# Patient Record
Sex: MALE | NOT HISPANIC OR LATINO | URBAN - METROPOLITAN AREA
[De-identification: names, ages, dates, MRNs, and addresses within clinical notes are randomized per-mention and may not be internally consistent; named-entity substitution may affect disease eponyms.]

---

## 2017-03-09 PROBLEM — L98.429: Status: ACTIVE | Noted: 2017-03-09

## 2017-05-30 ENCOUNTER — IMPORTED ENCOUNTER (OUTPATIENT)
Dept: URBAN - METROPOLITAN AREA CLINIC 1 | Facility: CLINIC | Age: 77
End: 2017-05-30

## 2017-05-30 PROBLEM — H26.493: Noted: 2017-05-30

## 2017-05-30 PROBLEM — H04.123: Noted: 2017-05-30

## 2017-05-30 PROBLEM — H16.143: Noted: 2017-05-30

## 2017-05-30 PROBLEM — H43.813: Noted: 2017-05-30

## 2017-05-30 PROBLEM — H35.013: Noted: 2017-05-30

## 2017-05-30 PROBLEM — Z96.1: Noted: 2017-05-30

## 2017-05-30 PROCEDURE — 92014 COMPRE OPH EXAM EST PT 1/>: CPT

## 2017-05-30 PROCEDURE — 92015 DETERMINE REFRACTIVE STATE: CPT

## 2017-05-30 NOTE — PATIENT DISCUSSION
1.  Pseudophakia (TORIC) OU w/ PCO OU: (Posterior Capsule Opacification)   Observe and consider yag cap when pt feels pco visually significant and visual acuity decreases to appropriate level. 2. GR I Athero Vascular/Sclerotic Dz OU - stable. 3. ABBY w/ PEK OU - The use/continuation of artificial tears were recommended. 4.  PVD w/o Tear OU - old/stable. Return for an appointment in November for [facility] / geovanny with Dr. Bronson Blandon.

## 2017-11-27 ENCOUNTER — IMPORTED ENCOUNTER (OUTPATIENT)
Dept: URBAN - METROPOLITAN AREA CLINIC 1 | Facility: CLINIC | Age: 77
End: 2017-11-27

## 2017-11-27 PROBLEM — Z96.1: Noted: 2017-11-27

## 2017-11-27 PROBLEM — H26.493: Noted: 2017-11-27

## 2017-11-27 PROBLEM — H43.813: Noted: 2017-11-27

## 2017-11-27 PROBLEM — H04.123: Noted: 2017-11-27

## 2017-11-27 PROBLEM — H16.143: Noted: 2017-11-27

## 2017-11-27 PROBLEM — H35.013: Noted: 2017-11-27

## 2017-11-27 PROCEDURE — 92012 INTRM OPH EXAM EST PATIENT: CPT

## 2017-11-27 NOTE — PATIENT DISCUSSION
1.  PCO OU- Visually Significant secondary to glare; Pt wishes to proceed with YAG Cap OD then OS. Pros cons risks and benefits discussed. 2.  Pseudophakia OU - (Toric OU) 3. ABBY w/ PEK OU- Cont ATs TID OU Routinely. 4.  PVD OU - RD precautions. 5.  GR I Athero Vascular Dz OU- stable observe. Return for an appointment in YAG Cap OD then OS. with Dr. Jaskaran Grigsby.

## 2017-12-15 ENCOUNTER — IMPORTED ENCOUNTER (OUTPATIENT)
Dept: URBAN - METROPOLITAN AREA CLINIC 1 | Facility: CLINIC | Age: 77
End: 2017-12-15

## 2017-12-15 PROCEDURE — 66821 AFTER CATARACT LASER SURGERY: CPT

## 2017-12-15 NOTE — PATIENT DISCUSSION
YAG CAP OD: (Consent signed and scanned into attachments) 1 gtt Prolensa applied. The purpose and nature of the procedure possible alternative methods of treatment the risks involved and the possibility of complications were discussed with patient. The Patient wishes to proceed and the consent was signed. The laser was then performed under topical anesthesia with no complications. Post op instructions were given to patient as well as a follow-up appointment. Patient was advised to call our office if any questions or concerns.  RTC as scheduled

## 2017-12-18 PROBLEM — H26.491: Noted: 2017-12-18

## 2018-01-19 ENCOUNTER — IMPORTED ENCOUNTER (OUTPATIENT)
Dept: URBAN - METROPOLITAN AREA CLINIC 1 | Facility: CLINIC | Age: 78
End: 2018-01-19

## 2018-01-19 PROBLEM — H26.492: Noted: 2018-01-19

## 2018-01-19 PROCEDURE — 66821 AFTER CATARACT LASER SURGERY: CPT

## 2018-01-19 NOTE — PATIENT DISCUSSION
YAG CAP OS: (Consent signed and scanned into attachments) 1 gtt Prolensa applied. The purpose and nature of the procedure possible alternative methods of treatment the risks involved and the possibility of complications were discussed with patient. The Patient wishes to proceed and the consent was signed. The laser was then performed under topical anesthesia with no complications. Post op instructions were given to patient as well as a follow-up appointment. Patient was advised to call our office if any questions or concerns.  RTC 1-6 week PO

## 2018-02-02 ENCOUNTER — IMPORTED ENCOUNTER (OUTPATIENT)
Dept: URBAN - METROPOLITAN AREA CLINIC 1 | Facility: CLINIC | Age: 78
End: 2018-02-02

## 2018-02-02 PROCEDURE — 99024 POSTOP FOLLOW-UP VISIT: CPT

## 2018-02-02 NOTE — PATIENT DISCUSSION
PO YAG Cap OU: good result. MRX given. Return for an appointment for a 30 in 6 months with Dr. Katlyn Montague.

## 2018-08-09 ENCOUNTER — IMPORTED ENCOUNTER (OUTPATIENT)
Dept: URBAN - METROPOLITAN AREA CLINIC 1 | Facility: CLINIC | Age: 78
End: 2018-08-09

## 2018-08-09 PROBLEM — H43.813: Noted: 2018-08-09

## 2018-08-09 PROBLEM — H16.143: Noted: 2018-08-09

## 2018-08-09 PROBLEM — Z96.1: Noted: 2018-08-09

## 2018-08-09 PROBLEM — H04.123: Noted: 2018-08-09

## 2018-08-09 PROBLEM — H35.013: Noted: 2018-08-09

## 2018-08-09 PROCEDURE — 92014 COMPRE OPH EXAM EST PT 1/>: CPT

## 2018-08-09 NOTE — PATIENT DISCUSSION
1.  ABBY w/ PEK OU-The increase of artificial tears OU to QID were recommended. Urged compliance w/ routine tear use. 2. GR I Athero Vascular Dz OU- Stable Observe. 3. PVD OU- Old stable. 4.  Pseudophakia OU (Torics OU)- H/o Yag OU5. Return for an appointment for 27 in 1 year with Dr. Estevan Johnson.

## 2019-08-13 ENCOUNTER — IMPORTED ENCOUNTER (OUTPATIENT)
Dept: URBAN - METROPOLITAN AREA CLINIC 1 | Facility: CLINIC | Age: 79
End: 2019-08-13

## 2019-08-13 PROBLEM — H16.143: Noted: 2019-08-13

## 2019-08-13 PROBLEM — H43.813: Noted: 2019-08-13

## 2019-08-13 PROBLEM — H04.123: Noted: 2019-08-13

## 2019-08-13 PROBLEM — Z96.1: Noted: 2019-08-13

## 2019-08-13 PROBLEM — H35.013: Noted: 2019-08-13

## 2019-08-13 PROCEDURE — 92014 COMPRE OPH EXAM EST PT 1/>: CPT

## 2019-08-13 NOTE — PATIENT DISCUSSION
1.  ABBY w/ PEK OU - Recommend ATs TID OU routinely 2. Pseudophakia OU - (Torics OU)- H/o Yag Cap OU3. GR I Athero Vascular Dz OU4. PVD OU - RD precautions. Patient deferred Manifest Rx today. Return for an appointment in 1 year 27 with Dr. Claudia Christian.

## 2020-07-16 ENCOUNTER — IMPORTED ENCOUNTER (OUTPATIENT)
Dept: URBAN - METROPOLITAN AREA CLINIC 1 | Facility: CLINIC | Age: 80
End: 2020-07-16

## 2020-07-16 PROBLEM — H16.143: Noted: 2020-07-16

## 2020-07-16 PROBLEM — H43.813: Noted: 2020-07-16

## 2020-07-16 PROBLEM — H01.001: Noted: 2020-07-16

## 2020-07-16 PROBLEM — H04.123: Noted: 2020-07-16

## 2020-07-16 PROBLEM — H35.013: Noted: 2020-07-16

## 2020-07-16 PROBLEM — H01.004: Noted: 2020-07-16

## 2020-07-16 PROBLEM — Z96.1: Noted: 2020-07-16

## 2020-07-16 PROBLEM — H01.111: Noted: 2020-07-16

## 2020-07-16 PROCEDURE — 92014 COMPRE OPH EXAM EST PT 1/>: CPT

## 2020-07-16 NOTE — PATIENT DISCUSSION
1.  Contact Dermatitis OU - Essentially resolved OU. 2. Anterior Blepharitis OU - Daily Hot compresses and lid scrubs were recommended. 3. ABBY w/ PEK OU-Recommend ATs TID OU routinely. 4. Pseudophakia OU - (Torics OU)- H/o Yag Cap OU5. GR I Athero Vascular Dz OU 6. PVD OU - RD precautions. Patient deferred Manifest Rx today. Return for an appointment in 1 year 27 with Dr. Yursa Herring.

## 2022-01-01 ENCOUNTER — HOME CARE VISIT (OUTPATIENT)
Dept: SCHEDULING | Facility: HOME HEALTH | Age: 82
End: 2022-01-01
Payer: MEDICARE

## 2022-01-01 ENCOUNTER — HOME CARE VISIT (OUTPATIENT)
Dept: HOME HEALTH SERVICES | Facility: HOME HEALTH | Age: 82
End: 2022-01-01

## 2022-01-01 ENCOUNTER — HOME CARE VISIT (OUTPATIENT)
Dept: HOME HEALTH SERVICES | Facility: HOME HEALTH | Age: 82
End: 2022-01-01
Payer: MEDICARE

## 2022-01-01 ENCOUNTER — HOME HEALTH ADMISSION (OUTPATIENT)
Dept: HOME HEALTH SERVICES | Facility: HOME HEALTH | Age: 82
End: 2022-01-01
Payer: MEDICARE

## 2022-01-01 VITALS
RESPIRATION RATE: 12 BRPM | HEART RATE: 87 BPM | SYSTOLIC BLOOD PRESSURE: 104 MMHG | TEMPERATURE: 97.5 F | DIASTOLIC BLOOD PRESSURE: 76 MMHG | OXYGEN SATURATION: 96 %

## 2022-01-01 VITALS
HEART RATE: 78 BPM | OXYGEN SATURATION: 98 % | DIASTOLIC BLOOD PRESSURE: 72 MMHG | TEMPERATURE: 98.8 F | SYSTOLIC BLOOD PRESSURE: 116 MMHG | RESPIRATION RATE: 17 BRPM

## 2022-01-01 VITALS
DIASTOLIC BLOOD PRESSURE: 70 MMHG | TEMPERATURE: 98.5 F | SYSTOLIC BLOOD PRESSURE: 100 MMHG | OXYGEN SATURATION: 99 % | RESPIRATION RATE: 16 BRPM | HEART RATE: 65 BPM

## 2022-01-01 VITALS
SYSTOLIC BLOOD PRESSURE: 115 MMHG | DIASTOLIC BLOOD PRESSURE: 71 MMHG | OXYGEN SATURATION: 96 % | HEART RATE: 72 BPM | TEMPERATURE: 97.4 F

## 2022-01-01 PROCEDURE — 3331090001 HH PPS REVENUE CREDIT

## 2022-01-01 PROCEDURE — 3331090002 HH PPS REVENUE DEBIT

## 2022-01-01 PROCEDURE — G0152 HHCP-SERV OF OT,EA 15 MIN: HCPCS

## 2022-01-01 PROCEDURE — G0157 HHC PT ASSISTANT EA 15: HCPCS

## 2022-01-01 PROCEDURE — G0151 HHCP-SERV OF PT,EA 15 MIN: HCPCS

## 2022-01-01 PROCEDURE — 400018 HH-NO PAY CLAIM PROCEDURE

## 2022-01-01 PROCEDURE — 400013 HH SOC

## 2022-04-02 ASSESSMENT — VISUAL ACUITY
OS_GLARE: 20/100
OS_CC: 20/30
OD_GLARE: 20/400
OS_CC: 20/40-1
OD_CC: 20/30
OS_CC: 20/30
OS_CC: 20/30
OS_CC: 20/30-1
OD_CC: 20/40
OD_CC: 20/40
OS_GLARE: 20/400
OD_CC: 20/30
OD_CC: 20/40-1
OD_CC: 20/40
OS_CC: 20/50
OD_GLARE: 20/100

## 2022-04-02 ASSESSMENT — TONOMETRY
OD_IOP_MMHG: 13
OS_IOP_MMHG: 13
OD_IOP_MMHG: 13
OS_IOP_MMHG: 13
OS_IOP_MMHG: 11
OD_IOP_MMHG: 12
OS_IOP_MMHG: 13

## 2022-04-23 NOTE — HOME HEALTH
Sher Ulrich PATIENT EDUCATION PROVIDED THIS VISIT:  Home safety to include Use of assitive device, HEP, walking, deep breathing, HIPPA, HHBOR     HEP consisting of:  1. Walking every hour during the day with Walker   2. Seated:  AP, LAQ with 5 sec hold, knee raises, x 10-20 as tolerated. 3. Deep breathing using incentive spirometer. Written HEP issued, patient/caregiver verbalized understanding; however, will need reinforcement and continued education for progression of HEP. Patient response to Education:Patient verbalized understanding. .  ASSESSMENT AND CONTINUED NEED FOR THE FOLLOWING SKILLS:   Patient presents with deficits following hospitalization for Acute hepatic encephalopathy, as described in this section and in body of note, in strength, gait and balance that impairs his ability to transfer, navigate his home and that increases his risk for falling and his dependence. HHPT is indicated in order to provide skilled interventions to improve and restore balance, strength, transfers and gait, that will improve Mr. Aguilar's overall safety and lower his risk for falls and injury. Skilled interventions will include: Instruction in ther-ex, NMR, HEP for strength, balance improvement; Training in gait, transfers, balance; Education in fall prevention, safety, energy conservation. PT will monitor vital signs, pain and patient response to therapy. POC:  Patient/caregiver instructed on POC and are agreeable to POC at this time. POC and admission to home health status to be called to Dr. Sofie Raygoza MD  on 4/25/2022. NOTE:  PT call to MD revealed this was the wrong MD.  Josephine HOLDER MD NAME IS:  DR. Abhijeet Georges  PLAN:  PT 1w1, 2w4, 1w1, may d/c early if goals met and patient requests. DISCHARGE PLANNING DISCUSSED: Discharge to self and family under MD supervision once all goals have been met or patient has reached max potential. Patient/caregiver verbalized understanding.     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 SUBJECTIVE:  Patient is an 79 y.o. male who was recently hospitalized for hepatic encephalopathy. He was active and playing golf up until 3 days before the hospitalization, that also includes dx: acute blood loss anemia. The patient has Hepatocarcinoma and has finished his course of chemo ad radiation therapy treatments. His goal is to not be homebound, and to be able to go back to the golf course. Mr. Hal Randall presents sitting up in his living room, several family members are present and he is pleasant and cooperative throughout the visit. LIVING SITUATION:  Patient lives alone with family members present to assist him at this time, in a 1 story home, 5 steps to enter. REQUIRES CAREGIVER ASSISTANCE FOR:  Meal prep, med set up, ADL safety, Household chores, Transportation, shopping, errands. PLOF:  Independent, was playing golf prior to hospitalization. MEDICATIONS RECONCILED AND UPDATED AS FOLLOWS: All meds entered and present. High risk medication teaching regarding anticoagulants, hyperglycemic agents or opioid narcotics performed for: n/a. Instructed patient on Follow MD instructions and contact PCP for questions.   The following medication discrepancies/interactions were noted: n/a. NEXT MD APPT:  4/25/22 with PCP. Patient/caregiver encouraged/instructed to keep appointment as lack of follow through with physician appointment could result in discontinuation of home care services for non-compliance. .  ROM:  WFL. STRENGTH: B Hips 4-/5, B knees 4/5, other LE ROM is WFL. WOUNDS:  n/a   BED MOBILITY:  Independent  TRANSFERS: Uses hands, has grab bars in bathrooms, uses armrests. GAIT: FWW. Gait characterized by Flexed posture, feet shuffling, short step lengths, decreased heel strike. Cues/instruction provided for upright posture. .  Note:  Unsteady gait without walker, frequent stopping to use wall for balance. STAIRS:  5 steps to enter, handrails, not assessed today. BALANCE:  Pt scored 15/28 on Tinetti Balance Assessment placing patient at high risk for falls. Patient response to treatment: Patient was fatigued after this visit, but in no apparent distress.

## 2022-04-27 NOTE — HOME HEALTH
SUBJECTIVE: \"On Wednesdays I have an appt with my liver doctor, and I go to albumin infusion every week on Thursdays around 8:30-9:00 and get home at different times. I got a HEP at the hospital with some standing up. My appetite is up and down, and I feel full when I get infusions. \"    Caregiver involvement: Pt sister present today to assist w/ ADLs. Medications reconciled and all medications ARE available in the home this visit. The following education was provided regarding medications, medication interactions, and look alike medications: Continue medications as prescribed. Report any changes to [facility] Staff. Medications are EFFECTIVE at this time. Home health supplies by type and quantity ordered/delivered this visit include: none    PATIENT EDUCATION provided this visit: Utilize [facility] when ambulating to avoid loss of balance/Fall prevention. PATIENT/CG RESPONSE TO EDUCATION: Pt verbalized understanding. OBJECTIVES:  See Care Plan Interventions    ASSESSMENT/PATIENT RESPONSE TO TREATMENT/PROGRESS TOWARDS GOALS:  Mr. Viki Borrego was able to demo consistent transfer ability by performing sit<>stand from varying height surfaces numerous times throughout visit when standing to greet therapist, and preparing for ambulation and standing there ex. Pt notes weakness in the chest region after performing 5x STS, and reports he'd rather do something else before returning to transfer training. Pt denies using SPC, but had agreed once explained that it will improve his balance during gait and decrease risk for falls. Pt ambulated 150 ft in his backyard and driveway, and will require use of SPC to assist w/ gait deficits when at home. Pt progressed to mod (I) for stair training and presents w/ good carry over when cued for correct sequencing of SPC to negotiate 5 back porch steps, as this was not assessed during PT evaluation.  Pt is an active individual who owns a self-assembled pulley machine that he can use for core strengthening and increase trunk stability to avoid loss of balance during functional transfers and gait training, however will address use at a later visit to incorporate safely into strengthening program. Pt will continue to benefit from HHPT service to increase BLE strength and activity tolerance, improve balance during gait, and prepare for community reentry, and return to hobby playing golf. Continued need for the following skills: HH Physical Therapy    PLAN: Ambulation w/ head turns and monitor marked deviations, use back yard pulley machine as part of standing there ex and address functional endurance and power (if able). The following discharge planning was discussed with the pt/caregiver: HHPT frequency 1w1, 2w4, 1w1 w/ planned sup visit on 5/12 and DC on 5/23 to self, family, and under MD supervision when goals met, or max benefits achieved. - pt/cg verbalized understanding.

## 2022-05-02 NOTE — Clinical Note
Thank you  ----- Message -----  From: Shell Champagne OT  Sent: 5/2/2022   4:34 PM EDT  To: Jeanmarie Jean, PT      Therapy Functional Score Assessment  Question                                            Score   Grooming                            0       Upper Dressing   0      Lower Dressing   0      Bathing                 1      Toilet Transfer                   0    Transfer                1            Ambulation                         3   Dyspnea                     2       Pain Interfering with activity        1  Est number therapy visits      1    Mr. Aguilar has good rehab potential to increase his endurance and balance for increased independece within his home environment. He currently is independent/modified independent with ADLs. He is supervision for house hold mobility with no assistive device. Pt expressing no need for home health occupational therapy and requesting no further services with pt to continue with homeopath physical therapy. Pt main goal is to progress his ability to ambulate outside on uneven surfaces to progress ability to return to golfing.     PLAN: D/C 1w1 with pt to discharge with home health PT

## 2022-05-02 NOTE — HOME HEALTH
Patient is an 79 y.o. male who was recently hospitalized for hepatic encephalopathy. He was active and playing golf up until 3 days before the hospitalization, that also includes dx: acute blood loss anemia. The patient has Hepatocarcinoma and has finished his course of chemo ad radiation therapy treatments. 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 He has family members present to assist him at this time. He was previously independent with ADLs, IADLs and functional mobility using no assistive devices. Pt was duncan robin. CAREGIVER ASSISTANCE: Pt has family assist with meal prep, med set up, ADL safety, Household chores, Transportation, shopping, errands. MEDICATION RECONCILATION: Pt reports no changes to medications since last reviewed and educated to continue as directed per MD.    SUBJECTIVE: Pt expressed motivated to get back to golfing. DME ORDERED/RECOMMENEDED: N/A    OBJECTIVE:  BATHING: Pt has walk in shower and shower chair. Pt supervision  TOILETING: Pt modified independent with toileting tasks. UB DRESSING: Pt independent for upper body dressing  LB DRESSING: Pt independent for lower body dressing to greg/doff socks shoes and pants.    GROOMING: Pt independent for grooming standing at sink for oral care, hair care and washing face. FEEDING: Pt independent with self feeding. Modified Archie RPE 3/10 after performing ambulation, transfers, and I/ADL assessment     OT instructed/demonstrated pt the following with good understanding:    IADL: Pt able to complete light meal prep. Pt expressed he made eggs this morning and did the dishes. Pt has family support for other IADL tasks as needed. AMBULATION: Pt supervision for ambulating short house hold distances with no assistive device. EOB/BED TRANSFER: Pt independent for bed mobility. COUCH: Pt independent for sit to stand bed mobility. TOILET: Pt modified independent for toilet transfers using grab bar  SHOWER:Pt supervision for shower transfers using walk in shower with grab bars and shower chair. PATIENT RESPONSE TO TREATMENT: Pt responded well to home health occupational therapy assessment with pt eager to progress his functional level with main goal to return to golfing. PATIENT EDUCATION PROVIDED THIS VISIT: OT role, energy conservation, fall prevention/safety training ADL/IADL tasks, continue diet and medications as instructed per MD, consult MD or urgent care for medical assistance as opposed to ER unless situation emergent. PATIENT LEVEL OF UNDERSTANDING OF EDUCATION PROVIDED: Pt able to teach back role of occupational therapy with good understanding. Pt able to teach back fall hazards and saftey precautions to take when completing. Pt able to teach back energy conservation techniques. REHAB POTENTIAL: Mr. Terri Nicholson has good rehab potenital to increase his endurance and balance for increased independnece within his home environment. He currently is independent/modified independent with ADLs. He is supervision for house hold mobility with no assistive device. Pt expressing no need for home health occupational therapy and requesting no further services with pt to continue with homehealth physical therapy.     HOME EXERCISE PROGRAM: N/A    ASSESSMENT: Pt presents with good BUE strength of 4+/5 which supports his ability to complete functional transfers from chairs, toilets and other surfaces. He presents with fair+/good stadning balance when completing house hold mobility with no assistive deivce. Pt however with slight postural sway to the right when ambulating which he was able to correct when he demonstrated ambulation using single point cane. Pt is able to complete his house hold mobility superviison to independently. Pt expressed biggest concern ambulating longer distances on uneven surfaces so he can return to golfing. However, pt with plans on addressing these concerns with physical therapy. Pt expressing no home health occupational therapy concerns at this time with request for no further home health OT and preference to progress goals with PT. Skilled Care Provided: Pt completed full occupational therapy assessment to include balance, coordination, strengthening, ADL education/training, functional mobility and home safety.     PLAN: D/C 1w1 with pt to discharge with home health PT

## 2022-05-02 NOTE — Clinical Note
Therapy Functional Score Assessment  Question                                            Score   Grooming                            0       Upper Dressing   0      Lower Dressing   0      Bathing                 1      Toilet Transfer                   0    Transfer                1            Ambulation                         3   Dyspnea                     2       Pain Interfering with activity        1  Est number therapy visits      1    Mr. Aguilar has good rehab potential to increase his endurance and balance for increased independece within his home environment. He currently is independent/modified independent with ADLs. He is supervision for house hold mobility with no assistive device. Pt expressing no need for home health occupational therapy and requesting no further services with pt to continue with homeopath physical therapy. Pt main goal is to progress his ability to ambulate outside on uneven surfaces to progress ability to return to golfing.     PLAN: D/C 1w1 with pt to discharge with home health PT

## 2022-05-04 NOTE — HOME HEALTH
SUBJECTIVE:  Pt reports he is feeling well today but gets tired very easily. CAREGIVER INVOLVEMENT/ASSISTANCE NEEDED FOR: pts dtrs take turns staying with pt assisting with all medical needs, meds, household tasks, tranportation. HOME HEALTH SUPPLIES BY TYPE AND QUANTITY ORDERED/DELIVERED THIS VISIT INCLUDE: n/a  OBJECTIVE:  See interventions. 1.Patient level of understanding of education provided:Pt demonstrates a good understanding of HEP and reports a good compliance. 2.Patient response to treatment:  Pt reports 5/10 RPE with all activities today. Pt amb outdoors without AD today but recommend cane for balance. Pt also was able to perform 2 standing exercises today. ASSESSMENT OF PROGRESS TOWARD GOALS: Pt amb outdoors with moderate difficulty and cuing to safely manvuer around/over obstacles. Pt performed standing exercises today to improve LE strength/gait ability. CONTINUED NEED FOR THE FOLLOWING SKILLS: Pt requires continued PT to improve LE strength/gait ability and standing balance. PLAN FOR NEXT VISIT: WIll plan to continue working towards improving LE strength/balance next visit to progresss to I amb. THE FOLLOWING DISCHARGE PLANNING WAS DISCUSSED WITH THE PATIENT/CAREGIVER: Pt is scheduled to continue PT 1 more visit this week then 2w2, 1w1.

## 2022-05-06 NOTE — Clinical Note
Pt reports he is too sore for PT on 5/6/22 secondary to having outpt surgery today. Pt request to reschedule for next week.

## 2022-05-08 NOTE — HOME HEALTH
Pt reports he is too sore for PT on 5/6/22 secondary to having surgery today. Pt request to reschedule for next week.

## 2022-05-10 PROBLEM — E87.20 LACTIC ACIDOSIS: Status: ACTIVE | Noted: 2022-01-01

## 2022-05-10 PROBLEM — D64.9 ACUTE ANEMIA: Status: ACTIVE | Noted: 2022-01-01

## 2022-05-10 PROBLEM — A41.9 SEPTIC SHOCK (HCC): Status: ACTIVE | Noted: 2022-01-01

## 2022-05-10 PROBLEM — J90 PLEURAL EFFUSION: Status: ACTIVE | Noted: 2022-01-01

## 2022-05-10 PROBLEM — K92.2 GI BLEED: Status: ACTIVE | Noted: 2022-01-01

## 2022-05-10 PROBLEM — K72.90 LIVER FAILURE (HCC): Status: ACTIVE | Noted: 2022-01-01

## 2022-05-10 PROBLEM — C22.9 LIVER CANCER (HCC): Status: ACTIVE | Noted: 2022-01-01

## 2022-05-10 PROBLEM — R65.21 SEPTIC SHOCK (HCC): Status: ACTIVE | Noted: 2022-01-01

## 2022-05-12 PROBLEM — E43 SEVERE PROTEIN-CALORIE MALNUTRITION (HCC): Chronic | Status: ACTIVE | Noted: 2022-01-01

## 2022-05-16 PROCEDURE — 3331090001 HH PPS REVENUE CREDIT

## 2022-05-16 PROCEDURE — 3331090002 HH PPS REVENUE DEBIT

## 2022-05-17 PROCEDURE — 3331090002 HH PPS REVENUE DEBIT

## 2022-05-17 PROCEDURE — 3331090001 HH PPS REVENUE CREDIT

## 2022-05-18 PROCEDURE — 3331090002 HH PPS REVENUE DEBIT

## 2022-05-18 PROCEDURE — 3331090001 HH PPS REVENUE CREDIT

## 2022-05-19 PROCEDURE — 3331090001 HH PPS REVENUE CREDIT

## 2022-05-19 PROCEDURE — 3331090002 HH PPS REVENUE DEBIT
